# Patient Record
Sex: MALE | Race: WHITE | Employment: OTHER | URBAN - METROPOLITAN AREA
[De-identification: names, ages, dates, MRNs, and addresses within clinical notes are randomized per-mention and may not be internally consistent; named-entity substitution may affect disease eponyms.]

---

## 2023-11-30 ENCOUNTER — HOSPITAL ENCOUNTER (EMERGENCY)
Age: 53
Discharge: HOME | End: 2023-11-30
Attending: EMERGENCY MEDICINE

## 2023-11-30 VITALS
DIASTOLIC BLOOD PRESSURE: 95 MMHG | BODY MASS INDEX: 31.1 KG/M2 | HEIGHT: 69 IN | TEMPERATURE: 97.9 F | RESPIRATION RATE: 18 BRPM | SYSTOLIC BLOOD PRESSURE: 125 MMHG | OXYGEN SATURATION: 96 % | HEART RATE: 99 BPM | WEIGHT: 210 LBS

## 2023-11-30 DIAGNOSIS — J44.1 COPD EXACERBATION (HCC): Primary | ICD-10-CM

## 2023-11-30 PROCEDURE — 99283 EMERGENCY DEPT VISIT LOW MDM: CPT

## 2023-11-30 PROCEDURE — 6370000000 HC RX 637 (ALT 250 FOR IP): Performed by: EMERGENCY MEDICINE

## 2023-11-30 PROCEDURE — 6360000002 HC RX W HCPCS: Performed by: EMERGENCY MEDICINE

## 2023-11-30 RX ORDER — IPRATROPIUM BROMIDE AND ALBUTEROL SULFATE 2.5; .5 MG/3ML; MG/3ML
1 SOLUTION RESPIRATORY (INHALATION)
Status: DISCONTINUED | OUTPATIENT
Start: 2023-11-30 | End: 2023-11-30 | Stop reason: HOSPADM

## 2023-11-30 RX ORDER — IPRATROPIUM BROMIDE AND ALBUTEROL SULFATE 2.5; .5 MG/3ML; MG/3ML
1 SOLUTION RESPIRATORY (INHALATION)
Status: DISCONTINUED | OUTPATIENT
Start: 2023-11-30 | End: 2023-11-30

## 2023-11-30 RX ORDER — ALBUTEROL SULFATE 90 UG/1
2 AEROSOL, METERED RESPIRATORY (INHALATION) 4 TIMES DAILY PRN
Qty: 18 G | Refills: 0 | Status: SHIPPED | OUTPATIENT
Start: 2023-11-30

## 2023-11-30 RX ORDER — DEXAMETHASONE SODIUM PHOSPHATE 4 MG/ML
10 INJECTION, SOLUTION INTRA-ARTICULAR; INTRALESIONAL; INTRAMUSCULAR; INTRAVENOUS; SOFT TISSUE ONCE
Status: COMPLETED | OUTPATIENT
Start: 2023-11-30 | End: 2023-11-30

## 2023-11-30 RX ORDER — PREDNISONE 20 MG/1
40 TABLET ORAL DAILY
Qty: 10 TABLET | Refills: 0 | Status: SHIPPED | OUTPATIENT
Start: 2023-11-30 | End: 2023-12-05

## 2023-11-30 RX ADMIN — DEXAMETHASONE SODIUM PHOSPHATE 10 MG: 4 INJECTION, SOLUTION INTRAMUSCULAR; INTRAVENOUS at 13:47

## 2023-11-30 RX ADMIN — IPRATROPIUM BROMIDE AND ALBUTEROL SULFATE 1 DOSE: 2.5; .5 SOLUTION RESPIRATORY (INHALATION) at 13:47

## 2023-11-30 ASSESSMENT — PAIN - FUNCTIONAL ASSESSMENT: PAIN_FUNCTIONAL_ASSESSMENT: NONE - DENIES PAIN

## 2023-11-30 NOTE — ED PROVIDER NOTES
Emergency Department Encounter    Patient: Asuncion Vega  MRN: [id number]  : 1970  Date of Evaluation: 2023  ED Provider:  Laura Mendoza MD    MDM:    Clinical Impression:  1. COPD exacerbation [facility])          Triage Chief Complaint: Shortness of Breath (pt states having asthma/copd flair up. Pt has recently been demo houses. He said sob x3 weeks out of inhalers. )        I completed a structured, evidence-based clinical evaluation to screen for acute emergent condition that poses a threat to life or bodily function. Diagnostic studies/Differential diagnosis included: (with independent interpretations \"as interpreted by me\" and tests considered but not performed) presentation is consistent with COPD exacerbation with wheeze. Patient was treated with breathing treatment and steroid in the emergency department as below. Patient had resolution of symptoms. Patient has a normal cardiopulmonary exam after treatment with no wheezing and with no crackles. No other evidence of pneumonia, pneumothorax, aortic dissection, acute rib fracture. No change in sputum as patient has had a nonproductive cough. Do not feel that antibiotics are necessary at this time. Patient will be treated as below. I considered the following moderate comorbidities in the care of this patient:   Patient  has a past medical history of Asthma and COPD (chronic obstructive pulmonary disease) ([address]).      Medications ordered in the ED:  ED Medication Orders (From admission, onward)      Start Ordered     Status Ordering Provider    23 1345 23 1335  dexamethasone (DECADRON) Oral 10 mg  ONCE         Last MAR action: Given - by Hermes Sandoval on 23 at [address], [address] [facility]    23 1345 23 1342  ipratropium 0.5 mg-albuterol 2.5 mg (DUONEB) nebulizer solution 1 Dose  EVERY 4 HOURS WHILE AWAKE RESP        Question:  Initiate RT Bronchodilator Protocol  Answer:  No    Last MAR action: Given - by Mariela Long

## 2023-11-30 NOTE — ED TRIAGE NOTES
pt states having asthma/copd flair up. Pt has recently been demo houses. He said sob x3 weeks out of inhalers.

## 2023-11-30 NOTE — ED NOTES
Pt verbalized understanding of discharge medications/side effects and follow-up care/instructions, denies any questions or concerns at this time. Prescriptions x3 sent to requested pharmacy; pt encouraged to return to the ED for any new or worsening symptoms.      Loli Benavides RN  11/30/23 [id number]